# Patient Record
Sex: FEMALE | Race: OTHER
[De-identification: names, ages, dates, MRNs, and addresses within clinical notes are randomized per-mention and may not be internally consistent; named-entity substitution may affect disease eponyms.]

---

## 2019-07-02 ENCOUNTER — HOSPITAL ENCOUNTER (OUTPATIENT)
Dept: HOSPITAL 63 - US | Age: 39
Discharge: HOME | End: 2019-07-02
Payer: COMMERCIAL

## 2019-07-02 DIAGNOSIS — N63.21: Primary | ICD-10-CM

## 2019-07-02 DIAGNOSIS — N63.11: ICD-10-CM

## 2019-07-02 DIAGNOSIS — N64.89: ICD-10-CM

## 2019-07-02 DIAGNOSIS — Z87.42: ICD-10-CM

## 2019-07-02 DIAGNOSIS — N88.8: ICD-10-CM

## 2019-07-02 PROCEDURE — 77063 BREAST TOMOSYNTHESIS BI: CPT

## 2019-07-02 PROCEDURE — 76830 TRANSVAGINAL US NON-OB: CPT

## 2019-07-02 PROCEDURE — 77067 SCR MAMMO BI INCL CAD: CPT

## 2019-07-02 PROCEDURE — 76856 US EXAM PELVIC COMPLETE: CPT

## 2019-07-02 NOTE — RAD
Examination: US PELVIS W/TV

 

History: Dysmenorrhea, polycystic ovary

 

Comparison/Correlation: None

 

Findings: Transabdominal and transvaginal pelvic ultrasound exam was 

performed. Transvaginal technique was utilized to assess the adnexal 

structures.

 

Uterus measures 12.2 cm x 5.6 cm x 4.5 cm. Endometrial thickness measures 

0.5 cm. Myometrium is normal. Nabothian cyst is present involving the 

uterine cervix.

 

Right adnexa measures 3.8 cm x 2.5 cm x 2.9 cm. Left ovary measures 3.5 cm

x 2 cm x 2.2 cm. Flow is identified involving the ovaries. No adnexal mass

or pelvic free fluid.

 

Adnexal follicles are physiologic in appearance.

 

 

Impression:

No mass lesion. Unremarkable exam.

 

Electronically signed by: Miguel Garcia MD (7/2/2019 5:27 PM) [facility]

## 2019-07-03 NOTE — RAD
DATE: 07/02/2019



EXAM: MAMMO [facility] SCREENING BILATERAL



HISTORY: Screening evaluation.  Baseline mammogram.



COMPARISON: None



This study was interpreted with the benefit of Computerized Aided Detection

(CAD).





Breast Density: HETERO The breast parenchyma is heterogenously dense, which

could reduce sensitivity of mammography. Breast parenchyma level C.





FINDINGS: No suspicious calcific effusions or distortion.  Right upper outer

breast mass is present measuring approximately 0.8 cm diameter and it is

located approximately 8 cm from the nipple.  Left upper outer breast mass

measuring 1.2 cm diameter is present located 6.5 cm from the nipple. 

Asymmetry involving the anterior left subareolar region is present.  





IMPRESSION: Bilateral masses.  Small left asymmetry.  Spot compression imaging

of the anterior left breast asymmetry is recommended.  Repeat right MLO images

recommended as motion is present.  Ultrasound imaging of mass lesions

bilaterally recommended.







BI-RADS CATEGORY: 0 INCOMPLETE: NEEDS ADDITIONAL IMAGING EVALUATION AND/OR

PRIOR MAMMOGRAMS FOR COMPARISON.



RECOMMENDED FOLLOW-UP: 12M 12 MONTH FOLLOW-UP



PQRS compliance statement: Patient information was entered into a reminder

system with a target due date in one year for the next mammogram.



Mammography is a sensitive method for finding small breast cancers, but it

does not detect them all and is not a substitute for careful clinical

examination.  A negative mammogram does not negate a clinically suspicious

finding and should not result in delay in biopsying a clinically suspicious

abnormality.



"Our facility is accredited by the [employer] Mammography

Program."